# Patient Record
Sex: MALE | Race: WHITE | NOT HISPANIC OR LATINO
[De-identification: names, ages, dates, MRNs, and addresses within clinical notes are randomized per-mention and may not be internally consistent; named-entity substitution may affect disease eponyms.]

---

## 2018-04-19 PROBLEM — Z00.00 ENCOUNTER FOR PREVENTIVE HEALTH EXAMINATION: Status: ACTIVE | Noted: 2018-04-19

## 2018-04-20 ENCOUNTER — APPOINTMENT (OUTPATIENT)
Dept: FAMILY MEDICINE | Facility: CLINIC | Age: 28
End: 2018-04-20

## 2018-04-23 ENCOUNTER — APPOINTMENT (OUTPATIENT)
Dept: FAMILY MEDICINE | Facility: CLINIC | Age: 28
End: 2018-04-23
Payer: MEDICAID

## 2018-04-23 VITALS
WEIGHT: 185 LBS | BODY MASS INDEX: 29.73 KG/M2 | HEART RATE: 94 BPM | OXYGEN SATURATION: 98 % | HEIGHT: 66 IN | SYSTOLIC BLOOD PRESSURE: 146 MMHG | RESPIRATION RATE: 6 BRPM | DIASTOLIC BLOOD PRESSURE: 78 MMHG

## 2018-04-23 VITALS — DIASTOLIC BLOOD PRESSURE: 70 MMHG | SYSTOLIC BLOOD PRESSURE: 124 MMHG

## 2018-04-23 DIAGNOSIS — Z82.49 FAMILY HISTORY OF ISCHEMIC HEART DISEASE AND OTHER DISEASES OF THE CIRCULATORY SYSTEM: ICD-10-CM

## 2018-04-23 DIAGNOSIS — F41.9 ANXIETY DISORDER, UNSPECIFIED: ICD-10-CM

## 2018-04-23 DIAGNOSIS — F17.200 NICOTINE DEPENDENCE, UNSPECIFIED, UNCOMPLICATED: ICD-10-CM

## 2018-04-23 DIAGNOSIS — G43.909 MIGRAINE, UNSPECIFIED, NOT INTRACTABLE, W/OUT STATUS MIGRAINOSUS: ICD-10-CM

## 2018-04-23 PROCEDURE — 99406 BEHAV CHNG SMOKING 3-10 MIN: CPT

## 2018-04-23 PROCEDURE — G0444 DEPRESSION SCREEN ANNUAL: CPT

## 2018-04-23 PROCEDURE — 99204 OFFICE O/P NEW MOD 45 MIN: CPT | Mod: 25

## 2018-04-23 RX ORDER — VENLAFAXINE HYDROCHLORIDE 37.5 MG/1
37.5 CAPSULE, EXTENDED RELEASE ORAL
Qty: 7 | Refills: 0 | Status: DISCONTINUED | COMMUNITY
Start: 2018-01-12

## 2018-04-23 RX ORDER — CLINDAMYCIN HYDROCHLORIDE 150 MG/1
150 CAPSULE ORAL
Qty: 20 | Refills: 0 | Status: DISCONTINUED | COMMUNITY
Start: 2018-04-06

## 2018-04-23 RX ORDER — BUTALBITAL, ACETAMINOPHEN AND CAFFEINE 300; 50; 40 MG/1; MG/1; MG/1
50-300-40 CAPSULE ORAL
Qty: 30 | Refills: 0 | Status: ACTIVE | COMMUNITY
Start: 2018-04-23 | End: 1900-01-01

## 2018-04-23 RX ORDER — IBUPROFEN 400 MG/1
400 TABLET, FILM COATED ORAL
Qty: 21 | Refills: 0 | Status: DISCONTINUED | COMMUNITY
Start: 2018-03-28

## 2018-04-23 RX ORDER — AMOXICILLIN 500 MG/1
500 CAPSULE ORAL
Qty: 21 | Refills: 0 | Status: DISCONTINUED | COMMUNITY
Start: 2018-03-28

## 2018-04-23 RX ORDER — NICOTINE TRANSDERMAL SYSTEM 14 MG/24H
14 PATCH, EXTENDED RELEASE TRANSDERMAL
Qty: 14 | Refills: 0 | Status: DISCONTINUED | COMMUNITY
Start: 2018-01-10

## 2018-05-01 ENCOUNTER — APPOINTMENT (OUTPATIENT)
Dept: FAMILY MEDICINE | Facility: CLINIC | Age: 28
End: 2018-05-01

## 2023-02-09 ENCOUNTER — APPOINTMENT (OUTPATIENT)
Dept: ORTHOPEDIC SURGERY | Facility: CLINIC | Age: 33
End: 2023-02-09
Payer: MEDICAID

## 2023-02-09 VITALS — HEIGHT: 66 IN | WEIGHT: 190 LBS | BODY MASS INDEX: 30.53 KG/M2

## 2023-02-09 PROCEDURE — 99204 OFFICE O/P NEW MOD 45 MIN: CPT

## 2023-02-09 NOTE — PHYSICAL EXAM
[de-identified] : Physical Exam: \par General: Well appearing, no acute distress \par Neurologic: A&Ox3, No focal deficits \par Head: NCAT without abrasions, lacerations, or ecchymosis to head, face, or scalp \par Eyes: No scleral icterus, no gross abnormalities \par Respiratory: Equal chest wall expansion bilaterally, no accessory muscle use \par Lymphatic: No lymphadenopathy palpated \par Skin: Warm and dry \par Psychiatric: Normal mood and affect\par \par Examination of the Right knee reveals a mild effusion, no erythema or ecchymosis.  There are no leg length discrepancies appreciated. The Right knee is slightly larger than the left.  The patient's range of motion is from 0-125° limited by pain.  The patient has no pain with patella mobility or apprehension.  The patient displays tenderness to palpation in the medial and lateral joint lines but more so in the medial joint line.  There is no patella facet tenderness.  The patient has a 4.5 out of 5 strength resisted straight leg raising as well as knee flexion and extension.  The knee demonstrates 2+ laxity to Lachman testing, as well as anterior drawer. Unable to tolerate a pivot shift. Stable to posterior drawer. There is no valgus or varus instability. The patient has pain with Avtar and Grind Testing.  The calf and thigh are soft, supple and nontender.  The patient is grossly neurovascularly intact distally.\par \par Examination of the Left knee reveals no significant effusion, erythema or ecchymosis.  There are no leg length discrepancies appreciated. The patient's range of motion is from 0-130°.  The knee is stable to Lachman testing, as well as anterior and posterior drawer.  There is no valgus or varus instability. The calf and thigh are soft, supple and nontender.  The patient is grossly neurovascularly intact distally.

## 2023-02-09 NOTE — HISTORY OF PRESENT ILLNESS
[Stable] : stable [3] : an average pain level of 3/10 [2] : a minimum pain level of 2/10 [4] : a maximum pain level of 4/10 [Walking] : worsened by walking [de-identified] : HOA NARANJO is a 32 year male being seen for initial visit R knee pain. He reports injuring his knee 2 weeks ago while running, he stopped suddenly twisted and felt a pop.  He went to an urgent care and [facility].  He had x-rays and an MRI.  He is referred here for definitive management.  He still feels subjective instability in his knee.  Is been using a supportive knee brace.  He works as a .  Currently, he endorses knee pain and instability. He reports no past injuries to his R knee. He presents with R knee MRI disc, no report available for review.

## 2023-02-09 NOTE — ADDENDUM
[FreeTextEntry1] : Documented by Lee Ann Crespo acting as a scribe for Dr. Camarena and Fabiano Ordoñez PA-C on 02/09/2023.   All medical record entries made by the Scribe were at my, Dr. Camarena, and Fabiano Ordoñez's, direction and personally dictated by me on 02/09/2023. I have reviewed the chart and agree that the record accurately reflects my personal performance of the history, physical exam, procedure and imaging.

## 2023-02-09 NOTE — DISCUSSION/SUMMARY
[de-identified] : We had a thorough discussion regarding the nature of his pain, the pathophysiology, as well as all treatment options. Based on clinical exam, MRI findings he has ACL tear of the right knee. All risks, benefits and alternatives to the proposed surgical procedure, right knee acl reconstruction with bone quad tendon bone autograft, medial and lateral meniscus repair vs partial menisectomy, were discussed in great detail with the patient. Risks include but are not limited to pain, bleeding, infection, stiffness, medical complications (including DVT, PE, MI), and risks of anesthesia. The patient expressed understanding and all questions were answered. The patient is electing to proceed, and will have the patient scheduled accordingly. I provided him contact number of my surgical coordinator Riaz, who will go over dates for this procedure. All questions were answered.

## 2023-03-23 RX ORDER — OXYCODONE 5 MG/1
5 TABLET ORAL
Qty: 20 | Refills: 0 | Status: ACTIVE | COMMUNITY
Start: 2023-03-23 | End: 1900-01-01

## 2023-03-23 RX ORDER — ONDANSETRON 4 MG/1
4 TABLET ORAL
Qty: 42 | Refills: 0 | Status: COMPLETED | COMMUNITY
Start: 2023-03-23 | End: 2023-03-30

## 2023-03-23 RX ORDER — ASPIRIN ENTERIC COATED TABLETS 81 MG 81 MG/1
81 TABLET, DELAYED RELEASE ORAL DAILY
Qty: 14 | Refills: 0 | Status: ACTIVE | COMMUNITY
Start: 2023-03-23 | End: 1900-01-01

## 2023-03-24 ENCOUNTER — APPOINTMENT (OUTPATIENT)
Dept: ORTHOPEDIC SURGERY | Facility: AMBULATORY SURGERY CENTER | Age: 33
End: 2023-03-24
Payer: MEDICAID

## 2023-03-24 PROCEDURE — 29888 ARTHRS AID ACL RPR/AGMNTJ: CPT | Mod: RT

## 2023-03-30 RX ORDER — OXYCODONE 5 MG/1
5 TABLET ORAL
Qty: 20 | Refills: 0 | Status: ACTIVE | COMMUNITY
Start: 2023-03-30 | End: 1900-01-01

## 2023-04-03 ENCOUNTER — APPOINTMENT (OUTPATIENT)
Dept: ORTHOPEDIC SURGERY | Facility: CLINIC | Age: 33
End: 2023-04-03
Payer: MEDICAID

## 2023-04-03 VITALS — WEIGHT: 190 LBS | HEIGHT: 66 IN | BODY MASS INDEX: 30.53 KG/M2

## 2023-04-03 PROCEDURE — 73560 X-RAY EXAM OF KNEE 1 OR 2: CPT | Mod: LT

## 2023-04-03 PROCEDURE — 99024 POSTOP FOLLOW-UP VISIT: CPT

## 2023-04-03 RX ORDER — MELOXICAM 7.5 MG/1
7.5 TABLET ORAL
Qty: 42 | Refills: 2 | Status: ACTIVE | COMMUNITY
Start: 2023-04-03 | End: 1900-01-01

## 2023-04-03 NOTE — HISTORY OF PRESENT ILLNESS
Pharmacy Dosing Service: Warfarin (Coumadin)  Spencer Hall is a 77year old female with a history of stroke (on outpatient coumadin) for whom pharmacy has been dosing warfarin (Coumadin) per consult from Dr. Tyler Veloz on 1/23/19. Goal INR is 2-3. [Clean/Dry/Intact] : clean, dry and intact [Neuro Intact] : an unremarkable neurological exam [Vascular Intact] : ~T peripheral vascular exam normal [Xray (Date:___)] : [unfilled] Xray -  [Doing Well] : is doing well [No Sign of Infection] : is showing no signs of infection [Adequate Pain Control] : has adequate pain control [de-identified] : spo right knee arthroscopy ACL reconstruction with quadriceps tendon autograft. DOS: 3/24/2023. [de-identified] : HOA is a 32 year male here today for spo right knee arthroscopy ACL reconstruction with quadriceps tendon autograft. DOS: 3/24/2023. He denies fever or chills, redness around or near the incision site(s), numbness/tingling. He denies nausea/ vomiting and admits to their appetite since their surgery being back to normal. Normal bowel habits at this time. Patient has started physical therapy. Patient presents today in [facility] and [facility] ambulating with crutches. Patient since their surgery has utilized tylenol as their primary pain control with relief in their symptoms. They no longer require narcotics for pain control.  [de-identified] : Right (R)  Knee  \par \par there is moderate effusion without warmth and mild ecchymosis throughout the leg. \par Knee motion is 0 - 90 degrees of passive ROM, straight leg raise with mild extension lag and pain free. \par Good quad strength. Full sensation intact and dorsalis pedis pulses 2+.  \par \par Special Tests: NEG Lachman, NEG anterior drawer, NEG posterior drawer with collateral testing and varus/valgus normal.  NEG Homans, no calf tenderness, soft and compressible  [de-identified] : 2 views of the left knee show no fracture, dislocation or bony lesions. Hardware is in proper placement. No deformities.  [de-identified] : I had a discussion with the patient regarding the operative and postoperative course. Patient should continue physical therapy. Patient will follow up in 4 wks for repeat clinical assessment. All questions were answered and the patient verbalized understanding. The patient is in agreement with this treatment plan.

## 2023-04-03 NOTE — ADDENDUM
[FreeTextEntry1] : Documented by Lee Ann Crespo acting as a scribe for Dr. Camarena and Fabiano Ordoñez PA-C on 04/03/2023.   All medical record entries made by the Scribe were at my, Dr. Camarena, and Fabiano Ordoñez's, direction and personally dictated by me on 04/03/2023. I have reviewed the chart and agree that the record accurately reflects my personal performance of the history, physical exam, procedure and imaging.

## 2023-04-12 RX ORDER — OXYCODONE 5 MG/1
5 TABLET ORAL
Qty: 20 | Refills: 0 | Status: ACTIVE | COMMUNITY
Start: 2023-04-12 | End: 1900-01-01

## 2023-05-03 ENCOUNTER — TRANSCRIPTION ENCOUNTER (OUTPATIENT)
Age: 33
End: 2023-05-03

## 2023-05-03 ENCOUNTER — APPOINTMENT (OUTPATIENT)
Dept: ORTHOPEDIC SURGERY | Facility: CLINIC | Age: 33
End: 2023-05-03
Payer: MEDICAID

## 2023-05-03 DIAGNOSIS — S89.91XA UNSPECIFIED INJURY OF RIGHT LOWER LEG, INITIAL ENCOUNTER: ICD-10-CM

## 2023-05-03 PROCEDURE — 99024 POSTOP FOLLOW-UP VISIT: CPT

## 2023-05-03 NOTE — HISTORY OF PRESENT ILLNESS
[___ Weeks Post Op] : [unfilled] weeks post op [Clean/Dry/Intact] : clean, dry and intact [Neuro Intact] : an unremarkable neurological exam [Vascular Intact] : ~T peripheral vascular exam normal [Doing Well] : is doing well [No Sign of Infection] : is showing no signs of infection [Adequate Pain Control] : has adequate pain control [de-identified] : spo right knee arthroscopy ACL reconstruction with quadriceps tendon autograft. DOS: 3/24/2023. [de-identified] : HOA is a 32 year male here today for spo right knee arthroscopy ACL reconstruction with quadriceps tendon autograft, 6 weeks ago. He denies fever or chills, redness around or near the incision site(s), numbness/tingling. He denies nausea/ vomiting and admits to their appetite since their surgery being back to normal. Normal bowel habits at this time. Patient has started physical therapy. Patient presents today in [facility] and [facility] ambulating with crutches. Patient since their surgery has utilized tylenol as their primary pain control with relief in their symptoms. They no longer require narcotics for pain control.  He has been intermittently using his brace.  He returned to work as a . [de-identified] : Right (R)  Knee  \par \par Incisions are well-healed.  There is no signs of infection.  He has no effusion.  Patient has range of motion from 0 to 120 degrees comfortably.  He has good strength and straight leg raising good quad strength.  He does a good quad set.  Mild quad atrophy.  His calf and thigh are soft and nontender.  He is grossly neurovascular intact distally. [de-identified] : No new imaging performed today.  [de-identified] : I had a discussion with the patient regarding the operative and postoperative course. Patient should continue physical therapy. Patient will follow up in 6 wks for repeat clinical assessment.  I cautioned him against doing too much too soon.  He will transition to a supportive knee brace while at work.  All questions were answered and the patient verbalized understanding. The patient is in agreement with this treatment plan.

## 2023-05-04 RX ORDER — OXYCODONE 5 MG/1
5 TABLET ORAL
Qty: 10 | Refills: 0 | Status: ACTIVE | COMMUNITY
Start: 2023-05-04 | End: 1900-01-01

## 2023-06-21 ENCOUNTER — APPOINTMENT (OUTPATIENT)
Dept: ORTHOPEDIC SURGERY | Facility: CLINIC | Age: 33
End: 2023-06-21
Payer: MEDICAID

## 2023-06-21 PROCEDURE — 99024 POSTOP FOLLOW-UP VISIT: CPT

## 2023-06-21 NOTE — PHYSICAL EXAM
[de-identified] : Physical Exam: \par General: Well appearing, no acute distress \par Neurologic: A&Ox3, No focal deficits \par Head: NCAT without abrasions, lacerations, or ecchymosis to head, face, or scalp \par Eyes: No scleral icterus, no gross abnormalities \par Respiratory: Equal chest wall expansion bilaterally, no accessory muscle use \par Lymphatic: No lymphadenopathy palpated \par Skin: Warm and dry \par Psychiatric: Normal mood and affect\par \par Right (R) Knee \par \par Incisions are well-healed. There is no signs of infection. He has no effusion. Patient has range of motion from 0 to 125 degrees comfortably. He has good strength and straight leg raising good quad strength. He does a good quad set. Mild quad atrophy. His calf and thigh are soft and nontender. He is grossly neurovascular intact distally. The surgical incision site(s) was clean, dry and intact. Additional findings included an unremarkable neurological exam and peripheral vascular exam normal.

## 2023-06-21 NOTE — REVIEW OF SYSTEMS
[Joint Pain] : joint pain [Negative] : Heme/Lymph [FreeTextEntry9] : spo right knee arthroscopy ACL reconstruction with quadriceps tendon autograft. DOS: 3/24/2023. \par

## 2023-06-21 NOTE — HISTORY OF PRESENT ILLNESS
[Improving] : improving [___ wks] : [unfilled] week(s) ago [1] : an average pain level of 1/10 [0] : a minimum pain level of 0/10 [2] : a maximum pain level of 2/10 [Bending] : worsened by bending [de-identified] : HOA is a 32 year male here today for spo right knee arthroscopy ACL reconstruction with quadriceps tendon autograft, 13 weeks ago. He reports he stopped going to PT. He reports improved ROM and strength. He reports pain with use of stairs and deep squats. Patient reports no specific VIET or acute trauma to joint. Patient denies mechanical symptoms such as clicking and popping, as well as instances of instability.  He is currently working as a .  He admits to riding jet skis.

## 2023-06-21 NOTE — DISCUSSION/SUMMARY
[de-identified] : I had a lengthy discussion with the patient regarding their current condition. We discussed the treatment plan.  At this time I recommended he continue with a home exercise program.  He is not interested in returning to physical therapy.  We discussed strengthening and achieving terminal knee flexion.  I cautioned him against doing too much too soon.  Caution against while adding a JetSki as well as playing sports.  He will follow-up with us in 3 months.  All questions were answered.

## 2023-07-20 ENCOUNTER — APPOINTMENT (OUTPATIENT)
Dept: ORTHOPEDIC SURGERY | Facility: CLINIC | Age: 33
End: 2023-07-20
Payer: MEDICAID

## 2023-07-20 ENCOUNTER — RESULT REVIEW (OUTPATIENT)
Age: 33
End: 2023-07-20

## 2023-07-20 ENCOUNTER — OUTPATIENT (OUTPATIENT)
Dept: OUTPATIENT SERVICES | Facility: HOSPITAL | Age: 33
LOS: 1 days | End: 2023-07-20
Payer: MEDICAID

## 2023-07-20 ENCOUNTER — APPOINTMENT (OUTPATIENT)
Dept: ULTRASOUND IMAGING | Facility: CLINIC | Age: 33
End: 2023-07-20
Payer: MEDICAID

## 2023-07-20 DIAGNOSIS — S89.91XD UNSPECIFIED INJURY OF RIGHT LOWER LEG, SUBSEQUENT ENCOUNTER: ICD-10-CM

## 2023-07-20 PROCEDURE — 93971 EXTREMITY STUDY: CPT | Mod: 26,RT

## 2023-07-20 PROCEDURE — 99214 OFFICE O/P EST MOD 30 MIN: CPT

## 2023-07-20 PROCEDURE — 93971 EXTREMITY STUDY: CPT

## 2023-07-20 NOTE — PHYSICAL EXAM
[de-identified] : Physical Exam: \par General: Well appearing, no acute distress \par Neurologic: A&Ox3, No focal deficits \par Head: NCAT without abrasions, lacerations, or ecchymosis to head, face, or scalp \par Eyes: No scleral icterus, no gross abnormalities \par Respiratory: Equal chest wall expansion bilaterally, no accessory muscle use \par Lymphatic: No lymphadenopathy palpated \par Skin: Warm and dry \par Psychiatric: Normal mood and affect\par \par Right (R) Knee \par \par Incisions are well-healed. There is no signs of infection. He has no effusion. Patient has range of motion from 0 to 125 degrees comfortably. He has good strength and straight leg raising good quad strength. He does a good quad set. Mild quad atrophy. His calf and thigh are soft and his right calf is tender in the mid belly.  He does have swelling distally around the ankle.  He is grossly neurovascular intact distally.

## 2023-07-20 NOTE — DISCUSSION/SUMMARY
[de-identified] : We had a thorough discussion regarding the nature of his pain, the pathophysiology, as well as all treatment options.  At this time there is a concern for DVT.  I am referring him for stat Doppler ultrasound.  He was instructed he may have to go to the ER if his ultrasound is positive for treatment.  All questions were answered. \par \par

## 2023-07-20 NOTE — HISTORY OF PRESENT ILLNESS
[Improving] : improving [___ wks] : [unfilled] week(s) ago [1] : an average pain level of 1/10 [0] : a minimum pain level of 0/10 [2] : a maximum pain level of 2/10 [Bending] : worsened by bending [de-identified] : HOA is a 32 year male here today for spo right knee arthroscopy ACL reconstruction with quadriceps tendon autograft, DOS: 3/24/2023.  The patient presents today as he drove to [place] about 7 days ago.  He states while in [place] he was riding a bicycle and he started noting pain in his calf and noted swelling in the lower leg.  He states he continues to smoke.  He notes that he has a brother who  of a pulmonary embolism.  He states he had did have some swelling in his knee which is since resolved but still notes some swelling in the lower leg.  He is ambulating without assistive devices.  He denies shortness of breath or any chest pain.

## 2023-08-09 ENCOUNTER — APPOINTMENT (OUTPATIENT)
Dept: ORTHOPEDIC SURGERY | Facility: CLINIC | Age: 33
End: 2023-08-09
